# Patient Record
Sex: MALE | Race: WHITE | NOT HISPANIC OR LATINO | Employment: FULL TIME | URBAN - NONMETROPOLITAN AREA
[De-identification: names, ages, dates, MRNs, and addresses within clinical notes are randomized per-mention and may not be internally consistent; named-entity substitution may affect disease eponyms.]

---

## 2023-05-23 ENCOUNTER — OFFICE VISIT (OUTPATIENT)
Dept: PRIMARY CARE | Facility: CLINIC | Age: 28
End: 2023-05-23
Payer: COMMERCIAL

## 2023-05-23 VITALS
WEIGHT: 193 LBS | DIASTOLIC BLOOD PRESSURE: 73 MMHG | BODY MASS INDEX: 25.58 KG/M2 | HEIGHT: 73 IN | SYSTOLIC BLOOD PRESSURE: 125 MMHG | HEART RATE: 52 BPM

## 2023-05-23 DIAGNOSIS — Z00.00 WELLNESS EXAMINATION: Primary | ICD-10-CM

## 2023-05-23 PROCEDURE — 99395 PREV VISIT EST AGE 18-39: CPT | Performed by: INTERNAL MEDICINE

## 2023-05-23 PROCEDURE — 1036F TOBACCO NON-USER: CPT | Performed by: INTERNAL MEDICINE

## 2023-05-23 RX ORDER — RABEPRAZOLE SODIUM 20 MG/1
1 TABLET, DELAYED RELEASE ORAL DAILY
COMMUNITY
Start: 2022-05-25

## 2023-05-23 ASSESSMENT — ENCOUNTER SYMPTOMS
VOMITING: 0
SINUS PAIN: 0
APPETITE CHANGE: 0
NUMBNESS: 0
ACTIVITY CHANGE: 0
COUGH: 0
ABDOMINAL DISTENTION: 0
SHORTNESS OF BREATH: 0
SINUS PRESSURE: 0
WEAKNESS: 0
DIARRHEA: 0
WHEEZING: 0
FATIGUE: 0
CHILLS: 0
NAUSEA: 0
BACK PAIN: 0
SORE THROAT: 0

## 2023-05-23 ASSESSMENT — PATIENT HEALTH QUESTIONNAIRE - PHQ9
1. LITTLE INTEREST OR PLEASURE IN DOING THINGS: NOT AT ALL
2. FEELING DOWN, DEPRESSED OR HOPELESS: NOT AT ALL
SUM OF ALL RESPONSES TO PHQ9 QUESTIONS 1 AND 2: 0

## 2023-05-23 NOTE — PROGRESS NOTES
"Subjective   Patient ID: Quirino Tijerina is a 27 y.o. male who presents for Annual Exam (Wellness).  HPI  Patient is here today for annual wellness visit.     Review of Systems   Constitutional:  Negative for activity change, appetite change, chills and fatigue.   HENT:  Negative for congestion, postnasal drip, sinus pressure, sinus pain and sore throat.    Respiratory:  Negative for cough, shortness of breath and wheezing.    Cardiovascular:  Negative for chest pain and leg swelling.   Gastrointestinal:  Negative for abdominal distention, diarrhea, nausea and vomiting.   Musculoskeletal:  Negative for back pain.   Neurological:  Negative for weakness and numbness.       Objective   /73 (BP Location: Right arm, Patient Position: Sitting, BP Cuff Size: Adult)   Pulse 52   Ht 1.854 m (6' 1\")   Wt 87.5 kg (193 lb)   BMI 25.46 kg/m²     Physical Exam  Constitutional:       General: He is not in acute distress.     Appearance: Normal appearance.   HENT:      Head: Normocephalic.      Right Ear: Tympanic membrane, ear canal and external ear normal.      Left Ear: Tympanic membrane, ear canal and external ear normal.      Nose: Nose normal.      Mouth/Throat:      Mouth: Mucous membranes are dry.      Pharynx: No oropharyngeal exudate.   Eyes:      General:         Right eye: No discharge.         Left eye: No discharge.      Extraocular Movements: Extraocular movements intact.      Pupils: Pupils are equal, round, and reactive to light.   Cardiovascular:      Rate and Rhythm: Normal rate and regular rhythm.      Heart sounds: No murmur heard.     No gallop.   Pulmonary:      Effort: Pulmonary effort is normal. No respiratory distress.      Breath sounds: Normal breath sounds. No wheezing.   Musculoskeletal:         General: No swelling. Normal range of motion.   Skin:     General: Skin is warm and dry.      Coloration: Skin is not jaundiced.   Neurological:      General: No focal deficit present.      Mental " Status: He is alert and oriented to person, place, and time.      Cranial Nerves: No cranial nerve deficit.   Psychiatric:         Mood and Affect: Mood normal.         Behavior: Behavior normal.         Flu shot 2022   COVID moderna 2     Assessment/Plan   Problem List Items Addressed This Visit          Other    Wellness examination - Primary     Will order screening cmp and lipid panel   Up to date on other age appropriate screening   Final diagnoses:   [Z00.00] Wellness examination

## 2023-05-26 ENCOUNTER — LAB (OUTPATIENT)
Dept: LAB | Facility: LAB | Age: 28
End: 2023-05-26
Payer: COMMERCIAL

## 2023-05-26 DIAGNOSIS — Z00.00 WELLNESS EXAMINATION: ICD-10-CM

## 2023-05-26 LAB
ALANINE AMINOTRANSFERASE (SGPT) (U/L) IN SER/PLAS: 18 U/L (ref 10–52)
ALBUMIN (G/DL) IN SER/PLAS: 4.4 G/DL (ref 3.4–5)
ALKALINE PHOSPHATASE (U/L) IN SER/PLAS: 77 U/L (ref 33–120)
ANION GAP IN SER/PLAS: 10 MMOL/L (ref 10–20)
ASPARTATE AMINOTRANSFERASE (SGOT) (U/L) IN SER/PLAS: 20 U/L (ref 9–39)
BILIRUBIN TOTAL (MG/DL) IN SER/PLAS: 0.6 MG/DL (ref 0–1.2)
CALCIUM (MG/DL) IN SER/PLAS: 9.4 MG/DL (ref 8.6–10.3)
CARBON DIOXIDE, TOTAL (MMOL/L) IN SER/PLAS: 28 MMOL/L (ref 21–32)
CHLORIDE (MMOL/L) IN SER/PLAS: 105 MMOL/L (ref 98–107)
CHOLESTEROL (MG/DL) IN SER/PLAS: 137 MG/DL (ref 0–199)
CHOLESTEROL IN HDL (MG/DL) IN SER/PLAS: 39 MG/DL
CHOLESTEROL/HDL RATIO: 3.5
CREATININE (MG/DL) IN SER/PLAS: 0.94 MG/DL (ref 0.5–1.3)
GFR MALE: >90 ML/MIN/1.73M2
GLUCOSE (MG/DL) IN SER/PLAS: 88 MG/DL (ref 74–99)
LDL: 85 MG/DL (ref 0–99)
POTASSIUM (MMOL/L) IN SER/PLAS: 4.1 MMOL/L (ref 3.5–5.3)
PROTEIN TOTAL: 6.9 G/DL (ref 6.4–8.2)
SODIUM (MMOL/L) IN SER/PLAS: 139 MMOL/L (ref 136–145)
TOBACCO SCREEN, URINE: NEGATIVE
TRIGLYCERIDE (MG/DL) IN SER/PLAS: 64 MG/DL (ref 0–149)
UREA NITROGEN (MG/DL) IN SER/PLAS: 14 MG/DL (ref 6–23)
VLDL: 13 MG/DL (ref 0–40)

## 2023-05-26 PROCEDURE — 36415 COLL VENOUS BLD VENIPUNCTURE: CPT

## 2023-05-26 PROCEDURE — 80053 COMPREHEN METABOLIC PANEL: CPT

## 2023-05-26 PROCEDURE — 80061 LIPID PANEL: CPT

## 2023-11-13 ENCOUNTER — PHARMACY VISIT (OUTPATIENT)
Dept: PHARMACY | Facility: CLINIC | Age: 28
End: 2023-11-13
Payer: COMMERCIAL

## 2023-11-13 PROCEDURE — RXMED WILLOW AMBULATORY MEDICATION CHARGE

## 2024-02-05 ENCOUNTER — PHARMACY VISIT (OUTPATIENT)
Dept: PHARMACY | Facility: CLINIC | Age: 29
End: 2024-02-05
Payer: COMMERCIAL

## 2024-02-05 PROCEDURE — RXMED WILLOW AMBULATORY MEDICATION CHARGE

## 2024-04-27 ENCOUNTER — OFFICE VISIT (OUTPATIENT)
Dept: URGENT CARE | Facility: CLINIC | Age: 29
End: 2024-04-27
Payer: COMMERCIAL

## 2024-04-27 VITALS
TEMPERATURE: 97.7 F | SYSTOLIC BLOOD PRESSURE: 121 MMHG | DIASTOLIC BLOOD PRESSURE: 78 MMHG | RESPIRATION RATE: 12 BRPM | HEART RATE: 89 BPM | OXYGEN SATURATION: 97 %

## 2024-04-27 DIAGNOSIS — K52.9 ACUTE GASTROENTERITIS: Primary | ICD-10-CM

## 2024-04-27 DIAGNOSIS — J06.9 URI WITH COUGH AND CONGESTION: ICD-10-CM

## 2024-04-27 PROBLEM — F41.9 ANXIETY DISORDER, UNSPECIFIED: Status: ACTIVE | Noted: 2024-04-27

## 2024-04-27 PROBLEM — K21.9 GERD (GASTROESOPHAGEAL REFLUX DISEASE): Status: ACTIVE | Noted: 2024-04-27

## 2024-04-27 PROCEDURE — 99212 OFFICE O/P EST SF 10 MIN: CPT

## 2024-04-27 NOTE — PROGRESS NOTES
[facility] [place] [facility]RG[facility]   PADDY NOTE:      Name: Durga Tijerina, 28 y.o.    CSN:[id number]   MRN:[id number]    PCP: Di Klein, DO    ALL:  No Known Allergies    History:    Chief Complaint: Sore Throat, Vomiting, Diarrhea, Poor Appetite, and BLOATING ( 1 WEEK)    Encounter Date: 4/27/2024  13:55hrs    HPI: The history was obtained from the patient. Durga is a 28 y.o. male, who presents with a chief complaint of Sore Throat, Vomiting, Diarrhea, Poor Appetite, and BLOATING ( 1 WEEK)     A week ago today he began with a sore throat which progressed to GI distress including nausea, vomiting and some watery diarrhea without bloody formation.  He is also had some progression of fatigue and generalized malaise during this time ultimately to improve slightly over the week with less bouts of nausea and vomiting but persistent diarrhea.  He is change his diet a little during this time but notes he still having some bloating of his abdomen and finds it seems a little improved with the use of Gas-X or drinking a carbonated beverage.    Last night he ate pizza, and then this morning he began again a couple of bouts of loose watery diarrhea.    He does endorse traveling to [place] a few weeks ago but did not endorse any atypical behaviors such as drinking from streams, frequenting wooded lot sore hiking.  He does mention eating out with his wife the night before with Chinese takeout and then [employer] the day of the symptoms began, his wife had no GI complaints.    Additionally he has been having some persistence rhino sinus inflammation with nasal drainage and mild pressure in the face which she has been using over-the-counter decongestant antitussive as well as Benadryl.    He denies possession of livestock, reptiles, amphetamines, denies any experience or exposures to birds, he denies any antibiotic use recently, water sources typically bottled or filtered, denies any  travel outside of the country.    Does endorse that his mother is currently undergoing some workup and testing for a liver condition, his paternal aunt has also recently passed away from a similar liver condition that he is unaware of and his sister allegedly has been diagnosed with Crohn's disease.    In 2020, he had a workup with Dr. John, who performed an EGD and prescribed Aciphex which seems to be assisting his overall symptoms.    He participates in [email or web address], has not done so this week, denies any significant supplementation with retail or [employer] services, endorses an occasional melatonin use when he cannot sleep, occasional multivitamin use, and when he is got a cold some antitussive and decongestant meds.    PMHx:    Past Medical History:   Diagnosis Date    Anxiety disorder, unspecified     Anxiety and depression    GERD (gastroesophageal reflux disease)               Current Outpatient Medications   Medication Sig Dispense Refill    RABEprazole (Aciphex) EC tablet Take 1 tablet (20 mg) by mouth once daily.      RABEprazole (Aciphex) EC tablet TAKE 1 TABLET DAILY. 90 tablet 3     No current facility-administered medications for this visit.         PMSx:    Past Surgical History:   Procedure Laterality Date    OTHER SURGICAL HISTORY  06/30/2020    No history of surgery       Fam Hx: No family history on file.    SOC. Hx:     Social History     Socioeconomic History    Marital status:      Spouse name: Not on file    Number of children: Not on file    Years of education: Not on file    Highest education level: Not on file   Occupational History    Not on file   Tobacco Use    Smoking status: Never    Smokeless tobacco: Never   Substance and Sexual Activity    Alcohol use: Yes    Drug use: Not Currently    Sexual activity: Not on file   Other Topics Concern    Not on file   Social History Narrative    Not on file     Social Determinants of Health     Financial Resource Strain: Not on file   Food  Insecurity: Not on file   Transportation Needs: Not on file   Physical Activity: Not on file   Stress: Not on file   Social Connections: Not on file   Intimate Partner Violence: Not on file   Housing Stability: Not on file         Vitals:    04/27/24 1349   BP: 121/78   Pulse: 89   Resp: 12   Temp: 36.5 °C (97.7 °F)   SpO2: 97%                Physical Exam  Vitals reviewed.   Constitutional:       Appearance: Normal appearance. He is normal weight.   HENT:      Head: Normocephalic and atraumatic.      Right Ear: Hearing normal.      Left Ear: Hearing normal.      Nose: Mucosal edema and congestion present.      Mouth/Throat:      Lips: Pink.      Mouth: Mucous membranes are moist. No oral lesions.      Tongue: No lesions.      Palate: No mass.      Pharynx: No oropharyngeal exudate or uvula swelling.      Tonsils: No tonsillar exudate or tonsillar abscesses.   Eyes:      General: Lids are normal. No scleral icterus.     Extraocular Movements: Extraocular movements intact.   Cardiovascular:      Rate and Rhythm: Normal rate and regular rhythm.   Pulmonary:      Effort: Pulmonary effort is normal.   Abdominal:      General: Abdomen is flat. There is no distension.      Palpations: Abdomen is soft. There is no hepatomegaly or splenomegaly.      Tenderness: There is no abdominal tenderness. There is no right CVA tenderness, left CVA tenderness or guarding. Negative signs include Mcnamara's sign and McBurney's sign.      Hernia: A hernia is present. Hernia is present in the umbilical area (Small less than 3 cm umbilical hernia noted at the 12 o'clock position).   Musculoskeletal:         General: Normal range of motion.      Cervical back: Normal range of motion and neck supple.      Comments: Grossly normal strength   Skin:     General: Skin is warm.      Capillary Refill: Capillary refill takes less than 2 seconds.      Coloration: Skin is not jaundiced.   Neurological:      Mental Status: He is alert and oriented to  person, place, and time.   Psychiatric:         Behavior: Behavior normal.         ____________________________________________________________________    I did personally review Durga's past medical history, surgical history, social history, as well as family history (when relevant).  In this case, I also oversaw the his drug management by reviewing his medication list, allergy list, as well as the medications that I prescribed during the UC course and/or recommended as an out-patient (including possible OTC medications such as acetaminophen, NSAIDs , etc).    After reviewing the items above, I did look at previous medical documentation, such as recent hospitalizations, office visits, and/or recent consultations with PCP/specialist.                          SDOH:   Another factor that I considered in Durga's care was his Social Determinants of Health (SDOH). During this UC encounter, he did not have social determinants of health. Those SDOH influencing Durga's care are: none      _____________________________________________________________________      UC COURSE/MEDICAL DECISION MAKING:    Durga is a 28 y.o., who presents with a working diagnosis of   1. Acute gastroenteritis    2. URI with cough and congestion     with a differential to include: Influenza, parainfluenza, rhinovirus, adenovirus, metapneumovirus, coronavirus, COVID-19, postnasal drip, strep pharyngitis, GERD, retropharyngeal abscess, tonsillitis, adenitis, seasonal allergies, norovirus, astrovirus    I recommend at this time further supportive care with brat diet as well as clear liquids, use of some carbonated noncaffeinated beverages, massage of the abdomen and/or yoga poses to improve overall bloating and functional movement of gaseous air through the intestines, recommend avoidance of inflammatory type foods including dairy, red meats and/or fried/fast food.  Discussed how to recognize when to advance his diet, and we also discussed when to  seek reevaluation or consideration for further testing.  Have given my personal contact information this week and should he have questions or request any further workup.  I did offer CBC, CMP, inflammatory marker testing, GI stool panel testing, evaluation for hepatitis as well as a pancreatic etiology, he with shared decision making opted for supportive therapies.  If not eating or drinking very much and having this GI distress it may be that the sinus meds could contribute to worsening symptoms, I recommend judicial use of such meds & opt from more #1 recommendations. Offered influenza/COVID testing, which he was not interested in at this time.        Luis Macario PA-C   Advanced Practice Provider  [facility]

## 2024-05-06 DIAGNOSIS — K21.9 GASTROESOPHAGEAL REFLUX DISEASE WITHOUT ESOPHAGITIS: Primary | ICD-10-CM

## 2024-05-07 PROCEDURE — RXMED WILLOW AMBULATORY MEDICATION CHARGE

## 2024-05-07 RX ORDER — RABEPRAZOLE SODIUM 20 MG/1
20 TABLET, DELAYED RELEASE ORAL DAILY
Qty: 90 TABLET | Refills: 3 | Status: SHIPPED | OUTPATIENT
Start: 2024-05-07 | End: 2025-05-07

## 2024-05-08 ENCOUNTER — PHARMACY VISIT (OUTPATIENT)
Dept: PHARMACY | Facility: CLINIC | Age: 29
End: 2024-05-08
Payer: COMMERCIAL

## 2024-07-17 ENCOUNTER — OFFICE VISIT (OUTPATIENT)
Dept: PRIMARY CARE | Facility: CLINIC | Age: 29
End: 2024-07-17
Payer: COMMERCIAL

## 2024-07-17 VITALS
WEIGHT: 200 LBS | HEIGHT: 73 IN | DIASTOLIC BLOOD PRESSURE: 80 MMHG | HEART RATE: 82 BPM | BODY MASS INDEX: 26.51 KG/M2 | SYSTOLIC BLOOD PRESSURE: 126 MMHG

## 2024-07-17 DIAGNOSIS — R10.11 RUQ PAIN: ICD-10-CM

## 2024-07-17 DIAGNOSIS — Z00.00 WELLNESS EXAMINATION: Primary | ICD-10-CM

## 2024-07-17 PROCEDURE — 1036F TOBACCO NON-USER: CPT | Performed by: INTERNAL MEDICINE

## 2024-07-17 PROCEDURE — 99395 PREV VISIT EST AGE 18-39: CPT | Performed by: INTERNAL MEDICINE

## 2024-07-17 PROCEDURE — 3008F BODY MASS INDEX DOCD: CPT | Performed by: INTERNAL MEDICINE

## 2024-07-17 PROCEDURE — 99213 OFFICE O/P EST LOW 20 MIN: CPT | Performed by: INTERNAL MEDICINE

## 2024-07-17 ASSESSMENT — ENCOUNTER SYMPTOMS
DIARRHEA: 0
COUGH: 0
VOMITING: 0
APPETITE CHANGE: 0
SINUS PRESSURE: 0
WEAKNESS: 0
FATIGUE: 0
SORE THROAT: 0
SINUS PAIN: 0
ACTIVITY CHANGE: 0
BACK PAIN: 0
WHEEZING: 0
CHILLS: 0
NUMBNESS: 0
NAUSEA: 0
ABDOMINAL DISTENTION: 0
SHORTNESS OF BREATH: 0

## 2024-07-17 NOTE — PROGRESS NOTES
"Subjective   Patient ID: Durga Tijerina \"Denise" is a 28 y.o. male who presents for Annual Exam.  HPI  Patient is here today for 1 year follow up     Patient reports that since his last appt his mom was diagnosed with Stage 2 fibrosis, SOTO, sister also had rapid progressive liver disease and . Was not a drinker either.     Review of Systems   Constitutional:  Negative for activity change, appetite change, chills and fatigue.   HENT:  Negative for congestion, postnasal drip, sinus pressure, sinus pain and sore throat.    Respiratory:  Negative for cough, shortness of breath and wheezing.    Cardiovascular:  Negative for chest pain and leg swelling.   Gastrointestinal:  Negative for abdominal distention, diarrhea, nausea and vomiting.   Musculoskeletal:  Negative for back pain.   Neurological:  Negative for weakness and numbness.       Objective   /80   Pulse 82   Ht 1.854 m (6' 1\")   Wt 90.7 kg (200 lb)   BMI 26.39 kg/m²     Physical Exam  Constitutional:       General: He is not in acute distress.     Appearance: Normal appearance.   HENT:      Head: Normocephalic.      Right Ear: Tympanic membrane, ear canal and external ear normal.      Left Ear: Tympanic membrane, ear canal and external ear normal.      Nose: Nose normal.      Mouth/Throat:      Mouth: Mucous membranes are dry.      Pharynx: No oropharyngeal exudate.   Eyes:      General:         Right eye: No discharge.         Left eye: No discharge.      Extraocular Movements: Extraocular movements intact.      Pupils: Pupils are equal, round, and reactive to light.   Cardiovascular:      Rate and Rhythm: Normal rate and regular rhythm.      Heart sounds: No murmur heard.     No gallop.   Pulmonary:      Effort: Pulmonary effort is normal. No respiratory distress.      Breath sounds: Normal breath sounds. No wheezing.   Abdominal:      General: Bowel sounds are normal. There is no distension.      Palpations: Abdomen is soft.      Tenderness: " There is no abdominal tenderness.   Musculoskeletal:         General: No swelling. Normal range of motion.      Cervical back: Neck supple. No tenderness.   Skin:     General: Skin is warm and dry.      Coloration: Skin is not jaundiced.   Neurological:      General: No focal deficit present.      Mental Status: He is alert and oriented to person, place, and time.      Cranial Nerves: No cranial nerve deficit.   Psychiatric:         Mood and Affect: Mood normal.         Behavior: Behavior normal.           Assessment/Plan   Problem List Items Addressed This Visit       Wellness examination - Primary    Relevant Orders    CBC and Auto Differential    Comprehensive Metabolic Panel    Lipid Panel    Hemoglobin A1C    TSH with reflex to Free T4 if abnormal     Other Visit Diagnoses       RUQ pain              Mom diagnosed with SOTO and aunt  of liver failure that was not a drinker or hep c, etc  either, possibly genetic issue  - will order cmp  - liver us to see if he has any fatty liver or other abnormalities     Immunizations   Flu    COVID received   PNA --   Shingles --   RSV--     PSA --   Colon cancer screening --       Final diagnoses:   [Z00.00] Wellness examination   [R10.11] RUQ pain

## 2024-07-18 ENCOUNTER — LAB (OUTPATIENT)
Dept: LAB | Facility: LAB | Age: 29
End: 2024-07-18
Payer: COMMERCIAL

## 2024-07-18 DIAGNOSIS — Z00.00 WELLNESS EXAMINATION: ICD-10-CM

## 2024-07-18 LAB
ALBUMIN SERPL BCP-MCNC: 4.4 G/DL (ref 3.4–5)
ALP SERPL-CCNC: 82 U/L (ref 33–120)
ALT SERPL W P-5'-P-CCNC: 16 U/L (ref 10–52)
ANION GAP SERPL CALC-SCNC: 10 MMOL/L (ref 10–20)
AST SERPL W P-5'-P-CCNC: 18 U/L (ref 9–39)
BASOPHILS # BLD AUTO: 0.04 X10*3/UL (ref 0–0.1)
BASOPHILS NFR BLD AUTO: 0.5 %
BILIRUB SERPL-MCNC: 0.5 MG/DL (ref 0–1.2)
BUN SERPL-MCNC: 14 MG/DL (ref 6–23)
CALCIUM SERPL-MCNC: 9.2 MG/DL (ref 8.6–10.3)
CHLORIDE SERPL-SCNC: 105 MMOL/L (ref 98–107)
CHOLEST SERPL-MCNC: 158 MG/DL (ref 0–199)
CHOLESTEROL/HDL RATIO: 3.4
CO2 SERPL-SCNC: 29 MMOL/L (ref 21–32)
CREAT SERPL-MCNC: 0.89 MG/DL (ref 0.5–1.3)
EGFRCR SERPLBLD CKD-EPI 2021: >90 ML/MIN/1.73M*2
EOSINOPHIL # BLD AUTO: 0.1 X10*3/UL (ref 0–0.7)
EOSINOPHIL NFR BLD AUTO: 1.1 %
ERYTHROCYTE [DISTWIDTH] IN BLOOD BY AUTOMATED COUNT: 11.9 % (ref 11.5–14.5)
EST. AVERAGE GLUCOSE BLD GHB EST-MCNC: 91 MG/DL
GLUCOSE SERPL-MCNC: 89 MG/DL (ref 74–99)
HBA1C MFR BLD: 4.8 %
HCT VFR BLD AUTO: 45.3 % (ref 41–52)
HDLC SERPL-MCNC: 46 MG/DL
HGB BLD-MCNC: 15.1 G/DL (ref 13.5–17.5)
IMM GRANULOCYTES # BLD AUTO: 0.03 X10*3/UL (ref 0–0.7)
IMM GRANULOCYTES NFR BLD AUTO: 0.3 % (ref 0–0.9)
LDLC SERPL CALC-MCNC: 86 MG/DL
LYMPHOCYTES # BLD AUTO: 2.78 X10*3/UL (ref 1.2–4.8)
LYMPHOCYTES NFR BLD AUTO: 31.7 %
MCH RBC QN AUTO: 31.4 PG (ref 26–34)
MCHC RBC AUTO-ENTMCNC: 33.3 G/DL (ref 32–36)
MCV RBC AUTO: 94 FL (ref 80–100)
MONOCYTES # BLD AUTO: 0.71 X10*3/UL (ref 0.1–1)
MONOCYTES NFR BLD AUTO: 8.1 %
NEUTROPHILS # BLD AUTO: 5.12 X10*3/UL (ref 1.2–7.7)
NEUTROPHILS NFR BLD AUTO: 58.3 %
NON HDL CHOLESTEROL: 112 MG/DL (ref 0–149)
NRBC BLD-RTO: 0 /100 WBCS (ref 0–0)
PLATELET # BLD AUTO: 267 X10*3/UL (ref 150–450)
POTASSIUM SERPL-SCNC: 4.2 MMOL/L (ref 3.5–5.3)
PROT SERPL-MCNC: 6.9 G/DL (ref 6.4–8.2)
RBC # BLD AUTO: 4.81 X10*6/UL (ref 4.5–5.9)
SODIUM SERPL-SCNC: 140 MMOL/L (ref 136–145)
TRIGL SERPL-MCNC: 129 MG/DL (ref 0–149)
TSH SERPL-ACNC: 2.07 MIU/L (ref 0.44–3.98)
VLDL: 26 MG/DL (ref 0–40)
WBC # BLD AUTO: 8.8 X10*3/UL (ref 4.4–11.3)

## 2024-07-18 PROCEDURE — 83036 HEMOGLOBIN GLYCOSYLATED A1C: CPT

## 2024-07-18 PROCEDURE — 36415 COLL VENOUS BLD VENIPUNCTURE: CPT

## 2024-07-18 PROCEDURE — 84443 ASSAY THYROID STIM HORMONE: CPT

## 2024-07-18 PROCEDURE — 85025 COMPLETE CBC W/AUTO DIFF WBC: CPT

## 2024-07-18 PROCEDURE — 80053 COMPREHEN METABOLIC PANEL: CPT

## 2024-07-18 PROCEDURE — 80061 LIPID PANEL: CPT

## 2024-07-19 ENCOUNTER — HOSPITAL ENCOUNTER (OUTPATIENT)
Dept: RADIOLOGY | Facility: HOSPITAL | Age: 29
Discharge: HOME | End: 2024-07-19
Payer: COMMERCIAL

## 2024-07-19 DIAGNOSIS — R10.11 RUQ PAIN: ICD-10-CM

## 2024-07-19 PROCEDURE — 76705 ECHO EXAM OF ABDOMEN: CPT

## 2024-08-05 PROCEDURE — RXMED WILLOW AMBULATORY MEDICATION CHARGE

## 2024-08-08 ENCOUNTER — PHARMACY VISIT (OUTPATIENT)
Dept: PHARMACY | Facility: CLINIC | Age: 29
End: 2024-08-08
Payer: COMMERCIAL

## 2024-08-28 ENCOUNTER — APPOINTMENT (OUTPATIENT)
Dept: PRIMARY CARE | Facility: CLINIC | Age: 29
End: 2024-08-28
Payer: COMMERCIAL

## 2024-10-30 PROCEDURE — RXMED WILLOW AMBULATORY MEDICATION CHARGE

## 2024-11-01 ENCOUNTER — PHARMACY VISIT (OUTPATIENT)
Dept: PHARMACY | Facility: CLINIC | Age: 29
End: 2024-11-01
Payer: COMMERCIAL

## 2025-01-28 PROCEDURE — RXMED WILLOW AMBULATORY MEDICATION CHARGE

## 2025-01-30 ENCOUNTER — PHARMACY VISIT (OUTPATIENT)
Dept: PHARMACY | Facility: CLINIC | Age: 30
End: 2025-01-30
Payer: COMMERCIAL

## 2025-04-28 DIAGNOSIS — K21.9 GASTROESOPHAGEAL REFLUX DISEASE WITHOUT ESOPHAGITIS: ICD-10-CM

## 2025-04-28 PROCEDURE — RXMED WILLOW AMBULATORY MEDICATION CHARGE

## 2025-04-28 RX ORDER — RABEPRAZOLE SODIUM 20 MG/1
20 TABLET, DELAYED RELEASE ORAL DAILY
Qty: 90 TABLET | Refills: 3 | Status: SHIPPED | OUTPATIENT
Start: 2025-04-28 | End: 2026-04-28

## 2025-04-29 ENCOUNTER — PHARMACY VISIT (OUTPATIENT)
Dept: PHARMACY | Facility: CLINIC | Age: 30
End: 2025-04-29
Payer: COMMERCIAL

## 2025-07-23 PROCEDURE — RXMED WILLOW AMBULATORY MEDICATION CHARGE

## 2025-07-25 ENCOUNTER — PHARMACY VISIT (OUTPATIENT)
Dept: PHARMACY | Facility: CLINIC | Age: 30
End: 2025-07-25
Payer: COMMERCIAL